# Patient Record
Sex: MALE | Race: WHITE | NOT HISPANIC OR LATINO | Employment: FULL TIME | URBAN - METROPOLITAN AREA
[De-identification: names, ages, dates, MRNs, and addresses within clinical notes are randomized per-mention and may not be internally consistent; named-entity substitution may affect disease eponyms.]

---

## 2022-11-18 ENCOUNTER — APPOINTMENT (EMERGENCY)
Dept: RADIOLOGY | Facility: HOSPITAL | Age: 52
End: 2022-11-18

## 2022-11-18 ENCOUNTER — APPOINTMENT (EMERGENCY)
Dept: CT IMAGING | Facility: HOSPITAL | Age: 52
End: 2022-11-18

## 2022-11-18 ENCOUNTER — HOSPITAL ENCOUNTER (OUTPATIENT)
Facility: HOSPITAL | Age: 52
Setting detail: OBSERVATION
Discharge: HOME | End: 2022-11-19
Attending: EMERGENCY MEDICINE | Admitting: STUDENT IN AN ORGANIZED HEALTH CARE EDUCATION/TRAINING PROGRAM

## 2022-11-18 DIAGNOSIS — E87.6 HYPOKALEMIA: ICD-10-CM

## 2022-11-18 DIAGNOSIS — R55 SYNCOPE: Primary | ICD-10-CM

## 2022-11-18 DIAGNOSIS — R94.31 PROLONGED Q-T INTERVAL ON ECG: ICD-10-CM

## 2022-11-18 DIAGNOSIS — R79.89 ELEVATED LACTIC ACID LEVEL: ICD-10-CM

## 2022-11-18 LAB
ALBUMIN SERPL BCP-MCNC: 4.1 G/DL (ref 3.5–5)
ALP SERPL-CCNC: 49 U/L (ref 34–104)
ALT SERPL W P-5'-P-CCNC: 14 U/L (ref 7–52)
AMPHETAMINES SERPL QL SCN: NEGATIVE
ANION GAP SERPL CALCULATED.3IONS-SCNC: 10 MMOL/L (ref 4–13)
AST SERPL W P-5'-P-CCNC: 19 U/L (ref 13–39)
BARBITURATES UR QL: NEGATIVE
BASOPHILS # BLD AUTO: 0.02 THOUSANDS/ÂΜL (ref 0–0.1)
BASOPHILS NFR BLD AUTO: 0 % (ref 0–1)
BENZODIAZ UR QL: NEGATIVE
BILIRUB SERPL-MCNC: 0.52 MG/DL (ref 0.2–1)
BILIRUB UR QL STRIP: NEGATIVE
BNP SERPL-MCNC: 14 PG/ML (ref 0–100)
BUN SERPL-MCNC: 10 MG/DL (ref 5–25)
CALCIUM SERPL-MCNC: 9 MG/DL (ref 8.4–10.2)
CARDIAC TROPONIN I PNL SERPL HS: 4 NG/L
CHLORIDE SERPL-SCNC: 104 MMOL/L (ref 96–108)
CK SERPL-CCNC: 85 U/L (ref 39–308)
CLARITY UR: CLEAR
CO2 SERPL-SCNC: 25 MMOL/L (ref 21–32)
COCAINE UR QL: NEGATIVE
COLOR UR: YELLOW
CREAT SERPL-MCNC: 0.96 MG/DL (ref 0.6–1.3)
EOSINOPHIL # BLD AUTO: 0.1 THOUSAND/ÂΜL (ref 0–0.61)
EOSINOPHIL NFR BLD AUTO: 2 % (ref 0–6)
ERYTHROCYTE [DISTWIDTH] IN BLOOD BY AUTOMATED COUNT: 12.4 % (ref 11.6–15.1)
ETHANOL SERPL-MCNC: 74 MG/DL
FLUAV RNA RESP QL NAA+PROBE: NEGATIVE
FLUBV RNA RESP QL NAA+PROBE: NEGATIVE
GFR SERPL CREATININE-BSD FRML MDRD: 91 ML/MIN/1.73SQ M
GLUCOSE SERPL-MCNC: 89 MG/DL (ref 65–140)
GLUCOSE SERPL-MCNC: 95 MG/DL (ref 65–140)
GLUCOSE UR STRIP-MCNC: NEGATIVE MG/DL
HCT VFR BLD AUTO: 39.2 % (ref 36.5–49.3)
HGB BLD-MCNC: 13.6 G/DL (ref 12–17)
HGB UR QL STRIP.AUTO: NEGATIVE
IMM GRANULOCYTES # BLD AUTO: 0.01 THOUSAND/UL (ref 0–0.2)
IMM GRANULOCYTES NFR BLD AUTO: 0 % (ref 0–2)
KETONES UR STRIP-MCNC: NEGATIVE MG/DL
LACTATE SERPL-SCNC: 2.4 MMOL/L (ref 0.5–2)
LEUKOCYTE ESTERASE UR QL STRIP: NEGATIVE
LYMPHOCYTES # BLD AUTO: 1.77 THOUSANDS/ÂΜL (ref 0.6–4.47)
LYMPHOCYTES NFR BLD AUTO: 36 % (ref 14–44)
MAGNESIUM SERPL-MCNC: 2 MG/DL (ref 1.9–2.7)
MCH RBC QN AUTO: 32.6 PG (ref 26.8–34.3)
MCHC RBC AUTO-ENTMCNC: 34.7 G/DL (ref 31.4–37.4)
MCV RBC AUTO: 94 FL (ref 82–98)
METHADONE UR QL: NEGATIVE
MONOCYTES # BLD AUTO: 0.33 THOUSAND/ÂΜL (ref 0.17–1.22)
MONOCYTES NFR BLD AUTO: 7 % (ref 4–12)
NEUTROPHILS # BLD AUTO: 2.64 THOUSANDS/ÂΜL (ref 1.85–7.62)
NEUTS SEG NFR BLD AUTO: 55 % (ref 43–75)
NITRITE UR QL STRIP: NEGATIVE
NRBC BLD AUTO-RTO: 0 /100 WBCS
OPIATES UR QL SCN: NEGATIVE
OXYCODONE+OXYMORPHONE UR QL SCN: NEGATIVE
PCP UR QL: NEGATIVE
PH UR STRIP.AUTO: 6 [PH]
PLATELET # BLD AUTO: 211 THOUSANDS/UL (ref 149–390)
PMV BLD AUTO: 9 FL (ref 8.9–12.7)
POTASSIUM SERPL-SCNC: 3.4 MMOL/L (ref 3.5–5.3)
PROT SERPL-MCNC: 6.7 G/DL (ref 6.4–8.4)
PROT UR STRIP-MCNC: NEGATIVE MG/DL
RBC # BLD AUTO: 4.17 MILLION/UL (ref 3.88–5.62)
RSV RNA RESP QL NAA+PROBE: NEGATIVE
SARS-COV-2 RNA RESP QL NAA+PROBE: NEGATIVE
SODIUM SERPL-SCNC: 139 MMOL/L (ref 135–147)
SP GR UR STRIP.AUTO: <=1.005 (ref 1–1.03)
THC UR QL: NEGATIVE
UROBILINOGEN UR QL STRIP.AUTO: 0.2 E.U./DL
WBC # BLD AUTO: 4.87 THOUSAND/UL (ref 4.31–10.16)

## 2022-11-18 RX ORDER — ONDANSETRON 2 MG/ML
4 INJECTION INTRAMUSCULAR; INTRAVENOUS EVERY 6 HOURS PRN
Status: DISCONTINUED | OUTPATIENT
Start: 2022-11-18 | End: 2022-11-18

## 2022-11-18 RX ORDER — POTASSIUM CHLORIDE 20 MEQ/1
20 TABLET, EXTENDED RELEASE ORAL ONCE
Status: COMPLETED | OUTPATIENT
Start: 2022-11-18 | End: 2022-11-18

## 2022-11-18 RX ORDER — ACETAMINOPHEN 325 MG/1
650 TABLET ORAL EVERY 6 HOURS PRN
Status: DISCONTINUED | OUTPATIENT
Start: 2022-11-18 | End: 2022-11-19 | Stop reason: HOSPADM

## 2022-11-18 RX ORDER — SODIUM CHLORIDE, SODIUM GLUCONATE, SODIUM ACETATE, POTASSIUM CHLORIDE, MAGNESIUM CHLORIDE, SODIUM PHOSPHATE, DIBASIC, AND POTASSIUM PHOSPHATE .53; .5; .37; .037; .03; .012; .00082 G/100ML; G/100ML; G/100ML; G/100ML; G/100ML; G/100ML; G/100ML
125 INJECTION, SOLUTION INTRAVENOUS CONTINUOUS
Status: DISCONTINUED | OUTPATIENT
Start: 2022-11-18 | End: 2022-11-19 | Stop reason: HOSPADM

## 2022-11-18 RX ADMIN — POTASSIUM CHLORIDE 20 MEQ: 1500 TABLET, EXTENDED RELEASE ORAL at 21:47

## 2022-11-18 RX ADMIN — SODIUM CHLORIDE 1000 ML: 0.9 INJECTION, SOLUTION INTRAVENOUS at 20:44

## 2022-11-18 RX ADMIN — SODIUM CHLORIDE, SODIUM GLUCONATE, SODIUM ACETATE, POTASSIUM CHLORIDE, MAGNESIUM CHLORIDE, SODIUM PHOSPHATE, DIBASIC, AND POTASSIUM PHOSPHATE 125 ML/HR: .53; .5; .37; .037; .03; .012; .00082 INJECTION, SOLUTION INTRAVENOUS at 23:43

## 2022-11-18 RX ADMIN — SODIUM CHLORIDE 1000 ML: 0.9 INJECTION, SOLUTION INTRAVENOUS at 21:48

## 2022-11-19 VITALS
WEIGHT: 202 LBS | HEIGHT: 75 IN | BODY MASS INDEX: 25.12 KG/M2 | TEMPERATURE: 97.9 F | HEART RATE: 64 BPM | SYSTOLIC BLOOD PRESSURE: 148 MMHG | OXYGEN SATURATION: 98 % | RESPIRATION RATE: 16 BRPM | DIASTOLIC BLOOD PRESSURE: 92 MMHG

## 2022-11-19 LAB
2HR DELTA HS TROPONIN: 0 NG/L
ANION GAP SERPL CALCULATED.3IONS-SCNC: 7 MMOL/L (ref 4–13)
BUN SERPL-MCNC: 8 MG/DL (ref 5–25)
CALCIUM SERPL-MCNC: 8.4 MG/DL (ref 8.4–10.2)
CARDIAC TROPONIN I PNL SERPL HS: 4 NG/L
CHLORIDE SERPL-SCNC: 110 MMOL/L (ref 96–108)
CO2 SERPL-SCNC: 24 MMOL/L (ref 21–32)
CREAT SERPL-MCNC: 0.76 MG/DL (ref 0.6–1.3)
ERYTHROCYTE [DISTWIDTH] IN BLOOD BY AUTOMATED COUNT: 12.5 % (ref 11.6–15.1)
GFR SERPL CREATININE-BSD FRML MDRD: 106 ML/MIN/1.73SQ M
GLUCOSE SERPL-MCNC: 99 MG/DL (ref 65–140)
HCT VFR BLD AUTO: 35.4 % (ref 36.5–49.3)
HGB BLD-MCNC: 12.2 G/DL (ref 12–17)
LACTATE SERPL-SCNC: 0.8 MMOL/L (ref 0.5–2)
MAGNESIUM SERPL-MCNC: 2 MG/DL (ref 1.9–2.7)
MCH RBC QN AUTO: 33.2 PG (ref 26.8–34.3)
MCHC RBC AUTO-ENTMCNC: 34.5 G/DL (ref 31.4–37.4)
MCV RBC AUTO: 97 FL (ref 82–98)
PLATELET # BLD AUTO: 206 THOUSANDS/UL (ref 149–390)
PMV BLD AUTO: 9.3 FL (ref 8.9–12.7)
POTASSIUM SERPL-SCNC: 3.9 MMOL/L (ref 3.5–5.3)
RBC # BLD AUTO: 3.67 MILLION/UL (ref 3.88–5.62)
SODIUM SERPL-SCNC: 141 MMOL/L (ref 135–147)
WBC # BLD AUTO: 4.36 THOUSAND/UL (ref 4.31–10.16)

## 2022-11-19 NOTE — ASSESSMENT & PLAN NOTE
· Lactate 2 4  · Likely elevated in the setting of mild dehydration and alcohol use  · S/P 2 L NS in ED  · Continue Isolyte @ 125 mL/hr  · Repeat lactic came back normal

## 2022-11-19 NOTE — ASSESSMENT & PLAN NOTE
· Presented to ED after a syncopal episode that occurred during dinner  · Patient reports sitting at dinner table, noticing tunnel vision, and waking up on the ground  · Unsure how long he was out for, reports feeling hot after gaining consciousness   · Per EMS, patient had 2 more syncopal episodes on route to ED that patient does not recall  · Patient reports decreased PO intake today due to driving and preparation to meet friends tonight  · Only consumed multiple cups of coffee, small oatmeal, and small salad  · Reports drinking multiple beers and a shot of hard alcohol prior to dinner with friends  · CT head - no acute intracranial findings  · K 3 4, lactate 2 4, alcohol 74  · QTc 482 on EKG  · Repeat EKG in AM  · Avoid any QT prolonging medications  · No neuro deficits noted on exam  · Syncope likely due to alcohol use in the setting of acute dehydration and decreased PO intake  · S/P 2 L NS in ED  · Orthostatic vitals check in ED AFTER 1 L IVF, negative  · Repeat ortho BP in AM  · Continue Isolyte @ 125 mL/hr  · Monitor on tele to r/o cardiac cause

## 2022-11-19 NOTE — PLAN OF CARE
Problem: CARDIOVASCULAR - ADULT  Goal: Maintains optimal cardiac output and hemodynamic stability  Description: INTERVENTIONS:  - Monitor I/O, vital signs and rhythm  - Monitor for S/S and trends of decreased cardiac output  - Administer and titrate ordered vasoactive medications to optimize hemodynamic stability  - Assess quality of pulses, skin color and temperature  - Assess for signs of decreased coronary artery perfusion  - Instruct patient to report change in severity of symptoms  Outcome: Progressing     Problem: METABOLIC, FLUID AND ELECTROLYTES - ADULT  Goal: Electrolytes maintained within normal limits  Description: INTERVENTIONS:  - Monitor labs and assess patient for signs and symptoms of electrolyte imbalances  - Administer electrolyte replacement as ordered  - Monitor response to electrolyte replacements, including repeat lab results as appropriate  - Instruct patient on fluid and nutrition as appropriate  Outcome: Progressing  Goal: Fluid balance maintained  Description: INTERVENTIONS:  - Monitor labs   - Monitor I/O and WT  - Instruct patient on fluid and nutrition as appropriate  - Assess for signs & symptoms of volume excess or deficit  Outcome: Progressing

## 2022-11-19 NOTE — DISCHARGE SUMMARY
Felix 46  Discharge- [facility] 1970, 49 y o  male MRN: [id number]  Unit/Bed#: -01 Encounter: [id number]  Primary Care Provider: No primary care provider on file  Date and time admitted to hospital: 11/18/2022  8:20 PM    * Syncope and collapse  Assessment & Plan  · Presented to ED after a syncopal episode that occurred during dinner  · Patient reports sitting at dinner table, noticing tunnel vision, and waking up on the ground  · Unsure how long he was out for, reports feeling hot after gaining consciousness   · Per EMS, patient had 2 more syncopal episodes on route to ED that patient does not recall  · Patient reports decreased PO intake today due to driving and preparation to meet friends tonight  · Only consumed multiple cups of coffee, small oatmeal, and small salad  · Reported drinking multiple beers and a shot of hard alcohol prior to dinner with friends  · CT head - no acute intracranial findings  · K 3 4, lactate 2 4, alcohol 74  · QTc 482 on EKG  · No neuro deficits  · Syncope likely due to alcohol use in the setting of acute dehydration and decreased PO intake  · S/P 2 L NS in ED  · Orthostatic vitals negative  · Placed on isolyte over night  · Lactic acidosis resolved and electrolytes WNL  · Tele to r/o cardiac cause - PVC's on tele no significant arrhythmias  · Repeat EKG showed improved qtc to 432, NSR 60 BPM, minimal criteria for LVH  · Pt feeling well asymptomic, discussed obtaining an echo to check for LVH  Patient states he would rather have it done as outpatient   Echo ordered      Elevated lactic acid level  Assessment & Plan  · Lactate 2 4  · Likely elevated in the setting of mild dehydration and alcohol use  · S/P 2 L NS in ED  · Continue Isolyte @ 125 mL/hr  · Repeat lactic came back normal      Medical Problems     Resolved Problems  Date Reviewed: 11/19/2022   None       Discharging Physician / Practitioner: David Molina DO  PCP: No primary care provider on file  Admission Date:   Admission Orders (From admission, onward)     Ordered        11/18/22 2136  Place in Observation  Once                      Discharge Date: 11/19/22    Consultations During Hospital Stay:  · none    Procedures Performed:   · none    Significant Findings / Test Results:   · Lactic acidosis  · Elevated serum ethanol    Incidental Findings:   · See above   · I reviewed the above mentioned incidental findings with the patient and/or family and they expressed understanding  Test Results Pending at Discharge (will require follow up):   · n/a     Outpatient Tests Requested:  · F/U outpatient echo    Complications:  None  Reason for Admission: syncope     Hospital Course:   Deven Landin is a 49 y o  male patient who originally presented to the hospital on 11/18/2022 due to syncopal episode  Patient had been out to a dinner party with his friends when he began noticing tunnel vision and woke up on the floor next to his chair  EMS was called  EN route to the hospital per EMS patient had 2 more syncopal episodes  Patient admitted to not having much oral intake throughout the day and admitted drinking beer along with hard liquor  In the ED CT head was negative for acute abnormalities  Lab work noted to show lactic acidosis of 2 4 hypokalemia 3 4 and serum ethanol 74  EKG showed normal sinus rhythm with QTC of 480  Patient was given 2 L normal saline IV potassium and placed on telemetry overnight  Telemetry did not show any significant arrhythmias, noted for some PVC's  Lab work on day of discharge was essentially normal   Repeat EKG was borderline positive for left ventricular hypertrophy  Discussed obtaining an echo with the patient  Patient would rather be discharged to have it done outpatient as procedure cannot be done until Monday morning  Outpatient echo has been ordered              Please see above list of diagnoses and related plan for additional information  Condition at Discharge: good    Discharge Day Visit / Exam:   Subjective:  Patient states that he feels well currently has no acute complaints denies having any dizziness, chest pain, palpitations, or shortness of breath  Vitals: Blood Pressure: 148/92 (11/19/22 0808)  Pulse: 64 (11/19/22 0808)  Temperature: 97 9 °F (36 6 °C) (11/19/22 0808)  Temp Source: Tympanic (11/19/22 0808)  Respirations: 16 (11/19/22 0808)  Height: 6' 3" (190 5 cm) (11/18/22 2041)  Weight - Scale: 91 6 kg (202 lb) (11/18/22 2232)  SpO2: 98 % (11/19/22 4471)  Exam:   Physical Exam  Vitals reviewed  HENT:      Head: Normocephalic and atraumatic  Right Ear: External ear normal       Left Ear: External ear normal       Nose: Nose normal       Mouth/Throat:      Mouth: Mucous membranes are moist       Pharynx: Oropharynx is clear  Eyes:      Extraocular Movements: Extraocular movements intact  Cardiovascular:      Rate and Rhythm: Normal rate and regular rhythm  Heart sounds: Normal heart sounds  Pulmonary:      Effort: Pulmonary effort is normal       Breath sounds: Normal breath sounds  Abdominal:      General: Abdomen is flat  Palpations: Abdomen is soft  Tenderness: There is no abdominal tenderness  Musculoskeletal:      Cervical back: Normal range of motion  Right lower leg: No edema  Left lower leg: No edema  Skin:     General: Skin is warm and dry  Neurological:      Mental Status: He is alert  Mental status is at baseline  Psychiatric:         Mood and Affect: Mood normal          Behavior: Behavior normal           Discussion with Family: Patient declined call to   Discharge instructions/Information to patient and family:   See after visit summary for information provided to patient and family  Provisions for Follow-Up Care:  See after visit summary for information related to follow-up care and any pertinent home health orders         Disposition: Home    Planned Readmission: no     Discharge Statement:  I spent 45 minutes discharging the patient  This time was spent on the day of discharge  I had direct contact with the patient on the day of discharge  Greater than 50% of the total time was spent examining patient, answering all patient questions, arranging and discussing plan of care with patient as well as directly providing post-discharge instructions  Additional time then spent on discharge activities  Discharge Medications:  See after visit summary for reconciled discharge medications provided to patient and/or family        **Please Note: This note may have been constructed using a voice recognition system**

## 2022-11-19 NOTE — ED NOTES
Patient transported to [address] [place] [address], [address] [place] [address]  11/18/22 7515

## 2022-11-19 NOTE — DISCHARGE INSTR - AVS FIRST PAGE
Your admitted due to syncope (fainting)  Suspect it was secondary to alcohol use and dehydration  CT of the head did not show any acute findings  Labs on day of discharge are stable along with your vital signs  The heart monitor did not show any significant abnormalities  Please follow-up with your primary care doctor on discharge    Ekg showed possible left ventricular hypertrophy  Out patient echo has been ordered to assess whether you have LVH

## 2022-11-19 NOTE — ED PROVIDER NOTES
History  Chief Complaint   Patient presents with   • Syncope     Pt arrived via ems  Pt stated out to dinner with friends and had to syncopal episodes  Pt stated having no current medical hx of syncopal episodes  Pt unsure if fell and hit head during episode  Patient is a 61-year-old male seen in the emergency department with concern for recurrent episodes of syncope  Patient states that he was out for dinner this evening, sitting down, and remember his visual field "closing in", next remembering waking up after passing out  Per EMS, patient had 2 episodes of syncope  Patient states that he believes that he might have been incontinence of urine  Patient notes no chest pain, shortness of breath, headache, neck pain, nausea, vomiting, diarrhea, weakness, numbness, tingling  Per EMS, patient had an initial blood pressure of 15A systolic  Patient states that he takes a multivitamin, but takes no other medications  Patient denies any personal or family history of seizures, sudden cardiac death, arrhythmia  Patient states that he recently had a preop visit as he is scheduled to have elbow surgery and states that his blood pressure was 974H systolic at that time  Patient states that he had a shot of an alcoholic beverage this evening, but notes no other new or unusual foods or beverages  None       History reviewed  No pertinent past medical history  History reviewed  No pertinent surgical history  History reviewed  No pertinent family history  I have reviewed and agree with the history as documented  E-Cigarette/Vaping     E-Cigarette/Vaping Substances     Social History     Tobacco Use   • Smoking status: Never   • Smokeless tobacco: Never   Substance Use Topics   • Alcohol use: Yes   • Drug use: Never       Review of Systems   Constitutional: Negative for chills and fever  HENT: Negative for ear pain and trouble swallowing  Eyes: Negative for pain and visual disturbance  Respiratory: Negative for cough and shortness of breath  Cardiovascular: Negative for chest pain and palpitations  Gastrointestinal: Negative for abdominal pain and vomiting  Genitourinary: Negative for decreased urine volume and difficulty urinating  Musculoskeletal: Negative for gait problem and neck stiffness  Skin: Negative for color change and rash  Neurological: Positive for syncope  Negative for weakness, numbness and headaches  Psychiatric/Behavioral: Negative for agitation and confusion  All other systems reviewed and are negative  Physical Exam  Physical Exam  Vitals and nursing note reviewed  Constitutional:       General: He is not in acute distress  Appearance: He is well-developed  HENT:      Head: Normocephalic and atraumatic  Right Ear: External ear normal       Left Ear: External ear normal       Nose: Nose normal       Mouth/Throat:      Pharynx: Oropharynx is clear  Eyes:      General: No scleral icterus  Conjunctiva/sclera: Conjunctivae normal    Cardiovascular:      Rate and Rhythm: Normal rate and regular rhythm  Heart sounds: No murmur heard  Pulmonary:      Effort: Pulmonary effort is normal  No respiratory distress  Breath sounds: Normal breath sounds  Abdominal:      General: There is no distension  Palpations: Abdomen is soft  Tenderness: There is no abdominal tenderness  Musculoskeletal:         General: No swelling, deformity or signs of injury  Cervical back: Normal range of motion and neck supple  Skin:     General: Skin is warm and dry  Capillary Refill: Capillary refill takes less than 2 seconds  Neurological:      General: No focal deficit present  Mental Status: He is alert  Cranial Nerves: No cranial nerve deficit  Sensory: No sensory deficit  Psychiatric:         Mood and Affect: Mood normal          Thought Content:  Thought content normal          Vital Signs  ED Triage Vitals Temperature Pulse Respirations Blood Pressure SpO2   11/18/22 2041 11/18/22 2041 11/18/22 2041 11/18/22 2041 11/18/22 2041   97 5 °F (36 4 °C) 62 18 112/72 100 %      Temp Source Heart Rate Source Patient Position - Orthostatic VS BP Location FiO2 (%)   11/18/22 2041 11/18/22 2153 11/18/22 2041 11/18/22 2041 --   Oral Monitor Lying Right arm       Pain Score       11/18/22 2041       No Pain           Vitals:    11/18/22 2041 11/18/22 2153 11/18/22 2156 11/18/22 2159   BP: 112/72 121/77 130/78 137/80   Pulse: 62 62 59 62   Patient Position - Orthostatic VS: Lying Lying - Orthostatic VS Sitting - Orthostatic VS Standing - Orthostatic VS         Visual Acuity      ED Medications  Medications   sodium chloride 0 9 % bolus 1,000 mL (1,000 mL Intravenous New Bag 11/18/22 2148)   sodium chloride 0 9 % bolus 1,000 mL (0 mL Intravenous Stopped 11/18/22 2211)   potassium chloride (K-DUR,KLOR-CON) CR tablet 20 mEq (20 mEq Oral Given 11/18/22 2147)       Diagnostic Studies  Results Reviewed     Procedure Component Value Units Date/Time    Rapid drug screen, urine [[id number]]  (Normal) Collected: 11/18/22 2049    Lab Status: Final result Specimen: Urine, Clean Catch Updated: 11/18/22 2130     Amph/Meth UR Negative     Barbiturate Ur Negative     Benzodiazepine Urine Negative     Cocaine Urine Negative     Methadone Urine Negative     Opiate Urine Negative     PCP Ur Negative     THC Urine Negative     Oxycodone Urine Negative    Narrative:      FOR MEDICAL PURPOSES ONLY  IF CONFIRMATION NEEDED PLEASE CONTACT THE LAB WITHIN 5 DAYS      Drug Screen Cutoff Levels:  AMPHETAMINE/METHAMPHETAMINES  1000 ng/mL  BARBITURATES     200 ng/mL  BENZODIAZEPINES     200 ng/mL  COCAINE      300 ng/mL  METHADONE      300 ng/mL  OPIATES      300 ng/mL  PHENCYCLIDINE     25 ng/mL  THC       50 ng/mL  OXYCODONE      100 ng/mL    COVID19, Influenza A/B, RSV PCR, [facility] [[id number]]  (Normal) Collected: 11/18/22 2033    Lab Status: Final result Specimen: Nares from Nose Updated: 11/18/22 2120     SARS-CoV-2 Negative     INFLUENZA A PCR Negative     INFLUENZA B PCR Negative     RSV PCR Negative    Narrative:      FOR PEDIATRIC PATIENTS - copy/paste COVID Guidelines URL to browser: [email or web address] [email or web address]  ashx    SARS-CoV-2 assay is a Nucleic Acid Amplification assay intended for the  qualitative detection of nucleic acid from SARS-CoV-2 in nasopharyngeal  swabs  Results are for the presumptive identification of SARS-CoV-2 RNA  Positive results are indicative of infection with SARS-CoV-2, the virus  causing COVID-19, but do not rule out bacterial infection or co-infection  with other viruses  Laboratories within the [place] and its  territories are required to report all positive results to the appropriate  public health authorities  Negative results do not preclude SARS-CoV-2  infection and should not be used as the sole basis for treatment or other  patient management decisions  Negative results must be combined with  clinical observations, patient history, and epidemiological information  This test has not been FDA cleared or approved  This test has been authorized by FDA under an Emergency Use Authorization  (EUA)  This test is only authorized for the duration of time the  declaration that circumstances exist justifying the authorization of the  emergency use of an in vitro diagnostic tests for detection of SARS-CoV-2  virus and/or diagnosis of COVID-19 infection under section 564(b)(1) of  the Act, 21 U  S C  749VBS-4(S)(7), unless the authorization is terminated  or revoked sooner  The test has been validated but independent review by FDA  and CLIA is pending  Test performed using [employer]: This RT-PCR assay targets N2,  a region unique to SARS-CoV-2  A conserved region in the E-gene was chosen  for pan-Sarbecovirus detection which includes SARS-CoV-2      According to CMS-2020-01-R, this platform meets the definition of high-[employer] technology  Lactic acid, plasma [[id number]]  (Abnormal) Collected: 11/18/22 2033    Lab Status: Final result Specimen: Blood from Arm, Right Updated: 11/18/22 2115     LACTIC ACID 2 4 mmol/L     Narrative:      Critical result on Jamari Holcomb, 1970, MRN [id number]    Lactic Acid: 2 4 mmol/L High Critical (Ref  Range: 0 5-2 0)    Please reply with "Acknowledged" upon receipt of this critical value  By acknowledging, you are agreeing to act upon this critical value  If this is not your patient, please advise  Reported by Gabriella Stanley on 11/18/22 at 9:07 PM        Result may be elevated if tourniquet was used during collection      Lactic acid 2 Hours [[id number]]     Lab Status: No result Specimen: Blood     B-Type Natriuretic Peptide(BNP), AN, CA, [facility] Only [[id number]]  (Normal) Collected: 11/18/22 2033    Lab Status: Final result Specimen: Blood from Arm, Right Updated: 11/18/22 2112     BNP 14 pg/mL     UA w Reflex to Microscopic w Reflex to Culture [[id number]]  (Normal) Collected: 11/18/22 2049    Lab Status: Final result Specimen: Urine, Clean Catch Updated: 11/18/22 2106     Color, UA Yellow     Clarity, UA Clear     Specific Gravity, UA <=1 005     pH, UA 6 0     Leukocytes, UA Negative     Nitrite, UA Negative     Protein, UA Negative mg/dl      Glucose, UA Negative mg/dl      Ketones, UA Negative mg/dl      Urobilinogen, UA 0 2 E U /dl      Bilirubin, UA Negative     Occult Blood, UA Negative    HS Troponin 0hr (reflex protocol) [[id number]]  (Normal) Collected: 11/18/22 2033    Lab Status: Final result Specimen: Blood from Arm, Right Updated: 11/18/22 2106     hs TnI 0hr 4 ng/L     HS Troponin I 2hr [[id number]]     Lab Status: No result Specimen: Blood     Comprehensive metabolic panel [[id number]]  (Abnormal) Collected: 11/18/22 2033    Lab Status: Final result Specimen: Blood from Arm, Right Updated: 11/18/22 2102     Sodium 139 mmol/L      Potassium 3 4 mmol/L      Chloride 104 mmol/L      CO2 25 mmol/L      ANION GAP 10 mmol/L      BUN 10 mg/dL      Creatinine 0 96 mg/dL      Glucose 95 mg/dL      Calcium 9 0 mg/dL      AST 19 U/L      ALT 14 U/L      Alkaline Phosphatase 49 U/L      Total Protein 6 7 g/dL      Albumin 4 1 g/dL      Total Bilirubin 0 52 mg/dL      eGFR 91 ml/min/1 73sq m     Narrative:      [place] guidelines for Chronic Kidney Disease (CKD):   •  Stage 1 with normal or high GFR (GFR > 90 mL/min/1 73 square meters)  •  Stage 2 Mild CKD (GFR = 60-89 mL/min/1 73 square meters)  •  Stage 3A Moderate CKD (GFR = 45-59 mL/min/1 73 square meters)  •  Stage 3B Moderate CKD (GFR = 30-44 mL/min/1 73 square meters)  •  Stage 4 Severe CKD (GFR = 15-29 mL/min/1 73 square meters)  •  Stage 5 End Stage CKD (GFR <15 mL/min/1 73 square meters)  Note: GFR calculation is accurate only with a steady state creatinine    Magnesium [[id number]]  (Normal) Collected: 11/18/22 2033    Lab Status: Final result Specimen: Blood from Arm, Right Updated: 11/18/22 2102     Magnesium 2 0 mg/dL     Ethanol [[id number]]  (Abnormal) Collected: 11/18/22 2033    Lab Status: Final result Specimen: Blood from Arm, Right Updated: 11/18/22 2102     Ethanol Lvl 74 mg/dL     CK (with reflex to MB) [[id number]]  (Normal) Collected: 11/18/22 2033    Lab Status: Final result Specimen: Blood from Arm, Right Updated: 11/18/22 2102     Total CK 85 U/L     Fingerstick Glucose (POCT) [[id number]]  (Normal) Collected: 11/18/22 2056    Lab Status: Final result Updated: 11/18/22 2057     POC Glucose 89 mg/dl     CBC and differential [[id number]] Collected: 11/18/22 2033    Lab Status: Final result Specimen: Blood from Arm, Right Updated: 11/18/22 2043     WBC 4 87 Thousand/uL      RBC 4 17 Million/uL      Hemoglobin 13 6 g/dL      Hematocrit 39 2 %      MCV 94 fL      MCH 32 6 pg      MCHC 34 7 g/dL      RDW 12 4 %      MPV 9 0 fL      Platelets 507 Thousands/uL      nRBC 0 /100 WBCs      Neutrophils Relative 55 %      Immat GRANS % 0 %      Lymphocytes Relative 36 %      Monocytes Relative 7 %      Eosinophils Relative 2 %      Basophils Relative 0 %      Neutrophils Absolute 2 64 Thousands/µL      Immature Grans Absolute 0 01 Thousand/uL      Lymphocytes Absolute 1 77 Thousands/µL      Monocytes Absolute 0 33 Thousand/µL      Eosinophils Absolute 0 10 Thousand/µL      Basophils Absolute 0 02 Thousands/µL                  CT head without contrast   Final Result by Rohit Sarmiento DO (11/18 2121)      No acute intracranial abnormality  Workstation performed: [id number]         XR chest 1 view portable    (Results Pending)              Procedures  ECG 12 Lead Documentation Only    Date/Time: 11/18/2022 8:43 PM  Performed by: Ingrid Reagan MD  Authorized by: Ingrid Reagan MD     Indications / Diagnosis:  Syncope  ECG reviewed by me, the ED Provider: yes    Patient location:  ED  Rate:     ECG rate:  64    ECG rate assessment: normal    Rhythm:     Rhythm: sinus rhythm    QRS:     QRS axis:  Normal  ST segments:     ST segments:  Non-specific  T waves:     T waves: non-specific    Comments:      Normal sinus rhythm at 64, normal axis, , , QTc 482, nonspecific ST-T wave abnormality, no definite evidence of acute ischemia             ED Course  ED Course as of 11/18/22 2231   Fri Nov 18, 2022 2124 CT head-    IMPRESSION:     No acute intracranial abnormality                                   SBIRT 22yo+    Flowsheet Row Most Recent Value   Initial Alcohol Screen: US AUDIT-C     1  How often do you have a drink containing alcohol? 2 Filed at: 11/18/2022 2104   2  How many drinks containing alcohol do you have on a typical day you are drinking? 1 Filed at: 11/18/2022 2104   Audit-C Score 3 Filed at: 11/18/2022 2104   JONNATHAN: How many times in the past year have you        Used an illegal drug or used a prescription medication for non-medical reasons? Never Filed at: 11/18/2022 2104                    [facility]  Number of Diagnoses or Management Options  Elevated lactic acid level  Hypokalemia  Prolonged Q-T interval on ECG  Syncope  Diagnosis management comments: Patient is a 81-year-old male seen in the emergency department with concern for recurrent episodes of syncope  Differential diagnosis includes arrhythmia, anemia, electrolyte abnormality, vasovagal episode, seizure  EKG was obtained and noted  Chest x-ray showed no infiltrate or pneumothorax  CT head showed no acute intracranial abnormality  COVID-19 test was ordered in the emergency department  Laboratory evaluation remarkable for low potassium of 3 4, elevated serum ethanol level of 74, elevated lactic acid of 2 4  Plan to admit patient (observation status) for further evaluation and treatment, with concern for recurrent syncope  Case was reviewed with medicine team   Plan of care was reviewed with patient         Amount and/or Complexity of Data Reviewed  Clinical lab tests: ordered and reviewed  Tests in the radiology section of CPT®: ordered and reviewed  Tests in the medicine section of CPT®: ordered and reviewed        Disposition  Final diagnoses:   Syncope   Hypokalemia   Prolonged Q-T interval on ECG   Elevated lactic acid level     Time reflects when diagnosis was documented in both MDM as applicable and the Disposition within this note     Time User Action Codes Description Comment    11/18/2022  8:31 PM Laddie Hammersmith Add [R55] Syncope     11/18/2022  9:36 PM Laddie Hammersmith Add [E87 6] Hypokalemia     11/18/2022  9:36 PM Laddie Hammersmith Add [R94 31] Prolonged Q-T interval on ECG     11/18/2022  9:36 PM Laddie Hammersmith Add [R79 89] Elevated lactic acid level       ED Disposition     ED Disposition   Admit    Condition   Stable    Date/Time   Fri Nov 18, 2022  9:35 PM    Comment   Case was reviewed with medicine team, and the patient's admission status was agreed to be Admission Status: observation status to the service of Dr Trudy Burkitt  Follow-up Information    None         There are no discharge medications for this patient  No discharge procedures on file      PDMP Review     None          ED Provider  Electronically Signed by           Lm Toro MD  11/18/22 5163

## 2022-11-19 NOTE — H&P
Martha U  67   H&P- Deven Landin 1970, 49 y o  male MRN: [id number]  Unit/Bed#: -[facility] Encounter: [id number]  Primary Care Provider: No primary care provider on file  Date and time admitted to hospital: 11/18/2022  8:20 PM    * Syncope and collapse  Assessment & Plan  · Presented to ED after a syncopal episode that occurred during dinner  · Patient reports sitting at dinner table, noticing tunnel vision, and waking up on the ground  · Unsure how long he was out for, reports feeling hot after gaining consciousness   · Per EMS, patient had 2 more syncopal episodes on route to ED that patient does not recall  · Patient reports decreased PO intake today due to driving and preparation to meet friends tonight  · Only consumed multiple cups of coffee, small oatmeal, and small salad  · Reports drinking multiple beers and a shot of hard alcohol prior to dinner with friends  · CT head - no acute intracranial findings  · K 3 4, lactate 2 4, alcohol 74  · QTc 482 on EKG  · Repeat EKG in AM  · Avoid any QT prolonging medications  · No neuro deficits noted on exam  · Syncope likely due to alcohol use in the setting of acute dehydration and decreased PO intake  · S/P 2 L NS in ED  · Orthostatic vitals check in ED AFTER 1 L IVF, negative  · Repeat ortho BP in AM  · Continue Isolyte @ 125 mL/hr  · Monitor on tele to r/o cardiac cause    Elevated lactic acid level  Assessment & Plan  · Lactate 2 4  · Likely elevated in the setting of mild dehydration and alcohol use  · S/P 2 L NS in ED  · Continue Isolyte @ 125 mL/hr  · Repeat lactic     VTE Pharmacologic Prophylaxis: VTE Score: 2 Low Risk (Score 0-2) - Encourage Ambulation  Code Status: Level 1 - Full Code   Discussion with family: Patient declined call to        Anticipated Length of Stay: Patient will be admitted on an observation basis with an anticipated length of stay of less than 2 midnights secondary to syncopal work up     Total Time for Visit, including Counseling / Coordination of Care: 30 minutes Greater than 50% of this total time spent on direct patient counseling and coordination of care  Chief Complaint: Syncope    History of Present Illness:  Eliza Cooks is a 49 y o  male with no significant past medical history who presents after a syncopal episode  Per patient, he was at a dinner party with friends and was sitting at the table when he began noticing tunnel vision and woke up on the floor next to his chair  Patient is unsure how long he was out for and unsure if he hit his head  He reports being hot after gaining consciousness and his friends supplied him with ice packs while awaiting for EMS  EMS reports that patient had two more additional syncopal episodes in the ambulance that the patient does not remember  Per patient, he drove out to see his friends today and reports only consuming multiple cups of coffee, oatmeal for breakfast, and a small salad for lunch  Patient reports drinking multiple beers and a shot of hard alcohol with friends prior to sitting down for dinner  Patient reports mild dizziness when moving from a supine to sitting position  He denies any fever, chills, headache, chest pain, shortness of breath, N/V/D, abdominal pain, urinary complaints, recent illness, or any other consitutional symptoms  Patient denies any past medical history, seizure disorder, or previous syncopal episode  In the ED, a CT head was completed that showed no acute intracranial abnormalities  His laboratory evaluation showed mild hypokalemia at 3 4, mildly increased lactic acid at 2 4, and mildly elevated serum ethanol level of 74  His EKG was noted to be sinus rhythm with prolonged QT interval   Patient was given 2 L Normal Saline and 20 mEq of Kdur and kept for overnight observation and syncopal work up       Review of Systems:  Review of Systems   Constitutional: Negative for chills, diaphoresis, fatigue and fever  HENT: Negative for ear pain, sinus pain, sore throat and trouble swallowing  Eyes: Negative for photophobia, pain and visual disturbance  Respiratory: Negative for cough and shortness of breath  Cardiovascular: Negative for chest pain and palpitations  Gastrointestinal: Negative for abdominal pain, diarrhea, nausea and vomiting  Genitourinary: Negative for difficulty urinating, flank pain, frequency and urgency  Musculoskeletal: Negative for back pain and myalgias  Skin: Negative for rash and wound  Neurological: Positive for dizziness (mild when going from laying to sitting) and syncope  Negative for tremors, facial asymmetry, speech difficulty, weakness, light-headedness, numbness and headaches  Psychiatric/Behavioral: Negative for confusion and decreased concentration  Past Medical and Surgical History:   History reviewed  No pertinent past medical history  History reviewed  No pertinent surgical history  Meds/Allergies:  Prior to Admission medications    Not on File     I have reviewed home medications with patient personally  Allergies: No Known Allergies    Social History:  Marital Status: /Civil Union   Occupation:   Patient Pre-hospital Living Situation: Home  Patient Pre-hospital Level of Mobility: walks  Patient Pre-hospital Diet Restrictions:   Substance Use History:   Social History     Substance and Sexual Activity   Alcohol Use Yes     Social History     Tobacco Use   Smoking Status Never   Smokeless Tobacco Never     Social History     Substance and Sexual Activity   Drug Use Never       Family History:  History reviewed  No pertinent family history      Physical Exam:     Vitals:   Blood Pressure: 142/85 (11/18/22 2232)  Pulse: 55 (11/18/22 2232)  Temperature: 97 7 °F (36 5 °C) (11/18/22 2232)  Temp Source: Oral (11/18/22 2232)  Respirations: 16 (11/18/22 2232)  Height: 6' 3" (190 5 cm) (11/18/22 2041)  Weight - Scale: 91 6 kg (202 lb) (11/18/22 2232)  SpO2: 99 % (11/18/22 2232)    Physical Exam  Vitals reviewed  Constitutional:       General: He is not in acute distress  Appearance: He is not ill-appearing or diaphoretic  HENT:      Head: Normocephalic and atraumatic  Nose: Nose normal       Mouth/Throat:      Mouth: Mucous membranes are moist       Pharynx: Oropharynx is clear  Eyes:      Extraocular Movements: Extraocular movements intact  Conjunctiva/sclera: Conjunctivae normal       Pupils: Pupils are equal, round, and reactive to light  Cardiovascular:      Rate and Rhythm: Normal rate and regular rhythm  Heart sounds: Normal heart sounds  No murmur heard  Pulmonary:      Effort: Pulmonary effort is normal  No respiratory distress  Breath sounds: Normal breath sounds  No wheezing, rhonchi or rales  Abdominal:      General: Bowel sounds are normal  There is no distension  Palpations: Abdomen is soft  Tenderness: There is no abdominal tenderness  There is no guarding or rebound  Musculoskeletal:         General: No tenderness  Normal range of motion  Cervical back: Normal range of motion and neck supple  Right lower leg: No edema  Left lower leg: No edema  Skin:     General: Skin is warm and dry  Capillary Refill: Capillary refill takes less than 2 seconds  Coloration: Skin is not pale  Neurological:      General: No focal deficit present  Mental Status: He is alert and oriented to person, place, and time  Cranial Nerves: No cranial nerve deficit  Sensory: No sensory deficit  Motor: No weakness        Coordination: Coordination normal    Psychiatric:         Mood and Affect: Mood normal          Behavior: Behavior normal           Additional Data:     Lab Results:  Results from last 7 days   Lab Units 11/18/22 2033   WBC Thousand/uL 4 87   HEMOGLOBIN g/dL 13 6   HEMATOCRIT % 39 2   PLATELETS Thousands/uL 211   NEUTROS PCT % 55   LYMPHS PCT % 36 MONOS PCT % 7   EOS PCT % 2     Results from last 7 days   Lab Units 11/18/22 2033   SODIUM mmol/L 139   POTASSIUM mmol/L 3 4*   CHLORIDE mmol/L 104   CO2 mmol/L 25   BUN mg/dL 10   CREATININE mg/dL 0 96   ANION GAP mmol/L 10   CALCIUM mg/dL 9 0   ALBUMIN g/dL 4 1   TOTAL BILIRUBIN mg/dL 0 52   ALK PHOS U/L 49   ALT U/L 14   AST U/L 19   GLUCOSE RANDOM mg/dL 95         Results from last 7 days   Lab Units 11/18/22 2056   POC GLUCOSE mg/dl 89         Results from last 7 days   Lab Units 11/18/22 2033   LACTIC ACID mmol/L 2 4*       Lines/Drains:  Invasive Devices     Peripheral Intravenous Line  Duration           Peripheral IV 11/18/22 Left Antecubital <1 day    Peripheral IV 11/18/22 Right;Ventral (anterior) Hand <1 day                    Imaging: Reviewed radiology reports from this admission including: CT head  CT head without contrast   Final Result by Isabella Navarro DO (11/18 2121)      No acute intracranial abnormality  Workstation performed: [id number]         XR chest 1 view portable    (Results Pending)       EKG and Other Studies Reviewed on Admission:   · EKG: NSR  HR 64     ** Please Note: This note has been constructed using a voice recognition system   **

## 2022-11-19 NOTE — UTILIZATION REVIEW
Initial Clinical Review    Admission: Date/Time/Statement:   Admission Orders (From admission, onward)     Ordered        11/18/22 2136  Place in Observation  Once                   Orders Placed This Encounter   Procedures   • Place in Observation     Standing Status:   Standing     Number of Occurrences:   1     Order Specific Question:   Level of Care     Answer:   Med Surg [16]     Order Specific Question:   Bed request comments     Answer:   tele     ED Arrival Information     Expected   -    Arrival   11/18/2022 20:20    Acuity   Urgent            Means of arrival   Ambulance    Escorted by   [facility]    Service   Hospitalist    Admission type   Emergency            Arrival complaint   Syncope        Chief Complaint   Patient presents with   • Syncope     Pt arrived via ems  Pt stated out to dinner with friends and had to syncopal episodes  Pt stated having no current medical hx of syncopal episodes  Pt unsure if fell and hit head during episode  Initial Presentation:   48 y/o male with no significant PMHx - presents via EMS to [facility] ED 2nd a syncopal episode  Per patient, he was at a dinner party with friends and was sitting at the table when he began noticing tunnel vision and woke up on the floor next to his chair  Patient is unsure how long he was out for and unsure if he hit his head  He reports being hot after gaining consciousness and his friends supplied him with ice packs while awaiting for EMS  EMS reports that patient had two more additional syncopal episodes in the ambulance that the patient does not remember  Per patient, he drove out to see his friends today and reports only consuming multiple cups of coffee, oatmeal for breakfast, and a small salad for lunch  Patient reports drinking multiple beers and a shot of hard alcohol with friends prior to sitting down for dinner  Patient reports mild dizziness when moving from a supine to sitting positioin    Labs: K+ 3 4, mildly increased lactic acid at 2 4, and mildly elevated serum ethanol level of 74  EKG showed SR with prolonged QT interval   Patient was given 2 L Normal Saline and 20 mEq of Kdur and kept for overnight observation and syncopal work up  ED Triage Vitals   Temperature Pulse Respirations Blood Pressure SpO2   11/18/22 2041 11/18/22 2041 11/18/22 2041 11/18/22 2041 11/18/22 2041   97 5 °F (36 4 °C) 62 18 112/72 100 %      Temp Source Heart Rate Source Patient Position - Orthostatic VS BP Location FiO2 (%)   11/18/22 2041 11/18/22 2153 11/18/22 2041 11/18/22 2041 --   Oral Monitor Lying Right arm       Wt Readings from Last 1 Encounters:   11/18/22 91 6 kg (202 lb)     Additional Vital Signs:   Date/Time Temp Pulse Resp BP MAP (mmHg) SpO2 O2 Device Patient Position - Orthostatic VS   11/19/22 0808 97 9 °F (36 6 °C) 64 16 148/92 115 98 % None (Room air) Lying   11/19/22 0400 97 5 °F (36 4 °C) 54 Abnormal  16 126/79 96 98 % None (Room air) Lying   11/18/22 2232 97 7 °F (36 5 °C) 55 16 142/85 110 99 % None (Room air) Lying   11/18/22 2213 97 6 °F (36 4 °C) -- -- -- -- -- -- --   11/18/22 2159 -- 62 16 137/80 -- -- -- Standing - Orthostatic VS   11/18/22 2156 -- 59 14 130/78 -- -- -- Sitting - Orthostatic VS   11/18/22 2153 -- 62 15 121/77 -- -- -- Lying - Orthostatic VS   11/18/22 2041 97 5 °F (36 4 °C) 62 18 112/72 -- 100 % None (Room air) Lying   11/18/22 2033 -- -- -- -- -- -- None (Room air) --     Pertinent Labs/Diagnostic Test Results:   XR chest 1 view portable   No acute cardiopulmonary disease  CT head without contrast   No acute intracranial abnormality          Results from last 7 days   Lab Units 11/18/22 2033   SARS-COV-2  Negative     Results from last 7 days   Lab Units 11/19/22  0534 11/18/22 2033   WBC Thousand/uL 4 36 4 87   HEMOGLOBIN g/dL 12 2 13 6   HEMATOCRIT % 35 4* 39 2   PLATELETS Thousands/uL 206 211   NEUTROS ABS Thousands/µL  --  2 64         Results from last 7 days   Lab Units 11/19/22  0534 11/18/22 2033   SODIUM mmol/L 141 139   POTASSIUM mmol/L 3 9 3 4*   CHLORIDE mmol/L 110* 104   CO2 mmol/L 24 25   ANION GAP mmol/L 7 10   BUN mg/dL 8 10   CREATININE mg/dL 0 76 0 96   EGFR ml/min/1 73sq m 106 91   CALCIUM mg/dL 8 4 9 0   MAGNESIUM mg/dL 2 0 2 0     Results from last 7 days   Lab Units 11/18/22 2033   AST U/L 19   ALT U/L 14   ALK PHOS U/L 49   TOTAL PROTEIN g/dL 6 7   ALBUMIN g/dL 4 1   TOTAL BILIRUBIN mg/dL 0 52     Results from last 7 days   Lab Units 11/18/22 2056   POC GLUCOSE mg/dl 89     Results from last 7 days   Lab Units 11/19/22 0534 11/18/22 2033   GLUCOSE RANDOM mg/dL 99 95       Results from last 7 days   Lab Units 11/18/22 2033   CK TOTAL U/L 85     Results from last 7 days   Lab Units 11/18/22 2302 11/18/22 2033   HS TNI 0HR ng/L  --  4   HS TNI 2HR ng/L 4  --    HSTNI D2 ng/L 0  --        Results from last 7 days   Lab Units 11/18/22 2302 11/18/22 2033   LACTIC ACID mmol/L 0 8 2 4*       Results from last 7 days   Lab Units 11/18/22 2033   BNP pg/mL 14       Results from last 7 days   Lab Units 11/18/22 2049   CLARITY UA  Clear   COLOR UA  Yellow   SPEC GRAV UA  <=1 005   PH UA  6 0   GLUCOSE UA mg/dl Negative   KETONES UA mg/dl Negative   BLOOD UA  Negative   PROTEIN UA mg/dl Negative   NITRITE UA  Negative   BILIRUBIN UA  Negative   UROBILINOGEN UA E U /dl 0 2   LEUKOCYTES UA  Negative     Results from last 7 days   Lab Units 11/18/22 2033   INFLUENZA A PCR  Negative   INFLUENZA B PCR  Negative   RSV PCR  Negative     Results from last 7 days   Lab Units 11/18/22 2049   AMPH/METH  Negative   BARBITURATE UR  Negative   BENZODIAZEPINE UR  Negative   COCAINE UR  Negative   METHADONE URINE  Negative   OPIATE UR  Negative   PCP UR  Negative   THC UR  Negative     Results from last 7 days   Lab Units 11/18/22 2033   ETHANOL LVL mg/dL 74*     ED Treatment:   Medication Administration from 11/18/2022 2020 to 11/18/2022 2226       Date/Time Order Dose Route Action     11/18/2022 2044 EST sodium chloride 0 9 % bolus 1,000 mL 1,000 mL Intravenous New Bag     11/18/2022 2147 EST potassium chloride (K-DUR,KLOR-CON) CR tablet 20 mEq 20 mEq Oral Given     11/18/2022 2148 EST sodium chloride 0 9 % bolus 1,000 mL 1,000 mL Intravenous New Bag     Present on Admission:  • Syncope and collapse  • Elevated lactic acid level    Admitting Diagnosis: Hypokalemia [E87 6]  Syncope [R55]  Prolonged Q-T interval on ECG [R94 31]  Elevated lactic acid level [R79 89]    Age/Sex: 49 y o  male    Admission Orders:  Telemetry  VS q4hrs  Orthostatic BP x 1  Nasal O2 at 2 L/min prn - Titrate SpO2 > 92 %  Continuous Pulse Oximetry  SCD  Up + OOB as tolerated  Diet Regular; House  I+O q shift  Daily weight  Serial HS Troponin q2hrs x 3    Scheduled Medications:    Medications 11/18 11/19   potassium chloride (K-DUR,KLOR-CON) CR tablet 20 mEq  Dose: 20 mEq  Freq: Once Route: PO  Start: 11/18/22 2115 End: 11/18/22 2147   Admin Instructions:   Swallow whole; do not crush or chew  Tablet may be split in half to facilitate swallowing  2147         sodium chloride 0 9 % bolus 1,000 mL  Dose: 1,000 mL  Freq: Once Route: IV  Last Dose: 1,000 mL (11/18/22 2148)  Start: 11/18/22 2130 End: 11/18/22 2248 2148         sodium chloride 0 9 % bolus 1,000 mL  Dose: 1,000 mL  Freq: Once Route: IV  Last Dose: Stopped (11/18/22 2211)  Start: 11/18/22 2030 End: 11/18/22 2211 2044 2211           Continuous IV Infusions:  IVF multi-electrolyte, 125 mL/hr, Intravenous, Continuous    PRN Meds:  acetaminophen, 650 mg, Oral, Q6H PRN  trimethobenzamide, 200 mg, Intramuscular, Q6H PRN    Network Utilization Review Department  ATTENTION: Please call with any questions or concerns to [phone] and carefully listen to the prompts so that you are directed to the right person   All voicemails are confidential   Kary Lee all requests for admission clinical reviews, approved or denied determinations and any other requests to dedicated fax number below belonging to the campus where the patient is receiving treatment   List of dedicated fax numbers for the Facilities:  [address] [place] [address] DENIALS (Administrative/Medical Necessity) [phone]   [address]  (Maternity/NICU/Pediatrics) [phone]   [address] [phone]   [address] [phone]   [address] [address] [address] [phone]   [address] [facility] [address] [facility] [phone]

## 2022-11-19 NOTE — ASSESSMENT & PLAN NOTE
· Presented to ED after a syncopal episode that occurred during dinner  · Patient reports sitting at dinner table, noticing tunnel vision, and waking up on the ground  · Unsure how long he was out for, reports feeling hot after gaining consciousness   · Per EMS, patient had 2 more syncopal episodes on route to ED that patient does not recall  · Patient reports decreased PO intake today due to driving and preparation to meet friends tonight  · Only consumed multiple cups of coffee, small oatmeal, and small salad  · Reported drinking multiple beers and a shot of hard alcohol prior to dinner with friends  · CT head - no acute intracranial findings  · K 3 4, lactate 2 4, alcohol 74  · QTc 482 on EKG  · No neuro deficits  · Syncope likely due to alcohol use in the setting of acute dehydration and decreased PO intake  · S/P 2 L NS in ED  · Orthostatic vitals negative  · Placed on isolyte over night  · Lactic acidosis resolved and electrolytes WNL  · Tele to r/o cardiac cause - PVC's on tele no significant arrhythmias  · Repeat EKG showed improved qtc to 432, NSR 60 BPM, minimal criteria for LVH  · Pt feeling well asymptomic, discussed obtaining an echo to check for LVH  Patient states he would rather have it done as outpatient   Echo ordered

## 2022-11-20 LAB
ATRIAL RATE: 51 BPM
ATRIAL RATE: 60 BPM
ATRIAL RATE: 64 BPM
P AXIS: 35 DEGREES
P AXIS: 50 DEGREES
P AXIS: 60 DEGREES
PR INTERVAL: 176 MS
PR INTERVAL: 180 MS
PR INTERVAL: 184 MS
QRS AXIS: 55 DEGREES
QRS AXIS: 58 DEGREES
QRS AXIS: 65 DEGREES
QRSD INTERVAL: 102 MS
QRSD INTERVAL: 104 MS
QRSD INTERVAL: 96 MS
QT INTERVAL: 432 MS
QT INTERVAL: 468 MS
QT INTERVAL: 500 MS
QTC INTERVAL: 432 MS
QTC INTERVAL: 460 MS
QTC INTERVAL: 482 MS
T WAVE AXIS: 29 DEGREES
T WAVE AXIS: 49 DEGREES
T WAVE AXIS: 56 DEGREES
VENTRICULAR RATE: 51 BPM
VENTRICULAR RATE: 60 BPM
VENTRICULAR RATE: 64 BPM